# Patient Record
Sex: FEMALE | Employment: FULL TIME | URBAN - METROPOLITAN AREA
[De-identification: names, ages, dates, MRNs, and addresses within clinical notes are randomized per-mention and may not be internally consistent; named-entity substitution may affect disease eponyms.]

---

## 2020-11-29 ENCOUNTER — CLINICAL SUPPORT (OUTPATIENT)
Dept: URGENT CARE | Facility: CLINIC | Age: 25
End: 2020-11-29
Payer: COMMERCIAL

## 2020-11-29 DIAGNOSIS — Z11.59 ENCOUNTER FOR SCREENING FOR OTHER VIRAL DISEASES: Primary | ICD-10-CM

## 2020-11-29 LAB
CTP QC/QA: YES
SARS-COV-2 RDRP RESP QL NAA+PROBE: NEGATIVE

## 2020-11-29 PROCEDURE — U0002: ICD-10-PCS | Mod: QW,S$GLB,, | Performed by: PHYSICIAN ASSISTANT

## 2020-11-29 PROCEDURE — U0002 COVID-19 LAB TEST NON-CDC: HCPCS | Mod: QW,S$GLB,, | Performed by: PHYSICIAN ASSISTANT

## 2025-06-19 ENCOUNTER — OFFICE VISIT (OUTPATIENT)
Dept: URGENT CARE | Facility: CLINIC | Age: 30
End: 2025-06-19
Payer: COMMERCIAL

## 2025-06-19 VITALS
RESPIRATION RATE: 16 BRPM | SYSTOLIC BLOOD PRESSURE: 143 MMHG | WEIGHT: 235 LBS | OXYGEN SATURATION: 98 % | TEMPERATURE: 98 F | HEART RATE: 75 BPM | DIASTOLIC BLOOD PRESSURE: 98 MMHG

## 2025-06-19 DIAGNOSIS — F41.1 GENERALIZED ANXIETY DISORDER WITH PANIC ATTACKS: ICD-10-CM

## 2025-06-19 DIAGNOSIS — F41.0 GENERALIZED ANXIETY DISORDER WITH PANIC ATTACKS: ICD-10-CM

## 2025-06-19 DIAGNOSIS — J02.9 SORE THROAT: ICD-10-CM

## 2025-06-19 DIAGNOSIS — J03.00 ACUTE NON-RECURRENT STREPTOCOCCAL TONSILLITIS: Primary | ICD-10-CM

## 2025-06-19 LAB
CTP QC/QA: YES
MOLECULAR STREP A: POSITIVE

## 2025-06-19 RX ORDER — HYDROXYZINE HYDROCHLORIDE 50 MG/1
50 TABLET, FILM COATED ORAL 4 TIMES DAILY PRN
Qty: 30 TABLET | Refills: 1 | Status: SHIPPED | OUTPATIENT
Start: 2025-06-19

## 2025-06-19 RX ORDER — AMOXICILLIN 500 MG/1
500 TABLET, FILM COATED ORAL EVERY 12 HOURS
Qty: 20 TABLET | Refills: 0 | Status: SHIPPED | OUTPATIENT
Start: 2025-06-19 | End: 2025-06-29

## 2025-06-19 NOTE — LETTER
June 19, 2025      [facility] [place]  [address] [address]  Phone: [phone]  Fax: [phone]       Patient: Eusebia Dumont   YOB: 1995  Date of Visit: 06/19/2025    To Whom It May Concern:    Justice Dumont  was at [facility] on 06/19/2025. The patient may return to work/school on 6/20/2025 with no restrictions. If you have any questions or concerns, or if I can be of further assistance, please do not hesitate to contact me.    Sincerely,      Becky Carrington PA-C

## 2025-06-19 NOTE — PROGRESS NOTES
Subjective:      Patient ID: Eusebia Dumont is a 29 y.o. female.    Vitals:  weight is 106.6 kg (235 lb). Her oral temperature is 97.8 °F (36.6 °C). Her blood pressure is 143/98 (abnormal) and her pulse is 75. Her respiration is 16 and oxygen saturation is 98%.     Chief Complaint: Sore Throat    This is a 29 y.o. female who presents today with a chief complaint of sore throat, hurts to swallow, tonsils are swollen, cough, congestion, trouble swallow x 2 days   Home Tx: Aspirin   PMH: Recurrent strep,     Provider note starts below:  Patient presents to clinic for evaluation of sore throat, pain with swallowing, sore tonsils which onset 2 days ago. 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 She has been taking aspirin and gargling with warm salt water. No fever, body aches, chills. Patient also reports increased stress and anxiety. States she is working on establishing care for this issue, but wondering if she can take anything for her anxiety in the mean time.     Sore Throat   The current episode started in the past 7 days. The problem has been gradually worsening. There has been no fever. The pain is at a severity of 6/10. Associated symptoms include swollen glands and trouble swallowing. Pertinent negatives include no abdominal pain, congestion, coughing, ear pain, headaches, neck pain, shortness of breath or vomiting. She has had no exposure to strep or mono.       Constitution: Negative for chills and fever.   HENT:  Positive for sore throat and trouble swallowing. Negative for ear pain, congestion and voice change.    Neck: Negative for neck pain.   Cardiovascular:  Negative for chest pain and palpitations.   Respiratory:  Negative for cough and shortness of breath.    Gastrointestinal:  Negative for abdominal pain, nausea and vomiting.   Musculoskeletal:  Negative for muscle cramps and muscle ache.   Skin:  Negative for pale and rash.   Neurological:  Negative for dizziness,  light-headedness, headaches, disorientation and altered mental status.   Psychiatric/Behavioral:  Positive for nervous/anxious. Negative for altered mental status, disorientation and confusion. The patient is nervous/anxious.       Objective:     Physical Exam   Constitutional: She is oriented to person, place, and time.  Non-toxic appearance. She does not appear ill. No distress.   HENT:   Head: Normocephalic and atraumatic.   Ears:   Right Ear: Tympanic membrane, external ear and ear canal normal.   Left Ear: Tympanic membrane, external ear and ear canal normal.   Nose: Nose normal.   Mouth/Throat: Mucous membranes are normal. Mucous membranes are moist. Posterior oropharyngeal edema and posterior oropharyngeal erythema present. No oropharyngeal exudate. Tonsils are 3+ on the right. Tonsils are 3+ on the left. No tonsillar exudate.   Eyes: Conjunctivae are normal. Extraocular movement intact   Neck: Neck supple.   Cardiovascular: Normal rate, regular rhythm, normal heart sounds and normal pulses.   Pulmonary/Chest: Effort normal and breath sounds normal. No respiratory distress.   Abdominal: Normal appearance.   Musculoskeletal: Normal range of motion.         General: Normal range of motion.   Lymphadenopathy:     She has cervical adenopathy.   Neurological: She is alert, oriented to person, place, and time and at baseline.   Skin: Skin is warm and dry.   Psychiatric: She experiences Normal attention and Normal perception. Her speech is normal and behavior is normal. Her mood appears anxious. Her affect is tearful.   Nursing note and vitals reviewed.    Assessment:     Results for orders placed or performed in visit on 06/19/25   POCT Strep A, Molecular    Collection Time: 06/19/25  1:36 PM   Result Value Ref Range    Molecular Strep A, POC Positive (A) Negative     Acceptable Yes        1. Acute non-recurrent streptococcal tonsillitis    2. Sore throat    3. Generalized anxiety disorder with panic  attacks        Plan:     Acute non-recurrent streptococcal tonsillitis  -     amoxicillin (AMOXIL) 500 MG Tab; Take 1 tablet (500 mg total) by mouth every 12 (twelve) hours. for 10 days  Dispense: 20 tablet; Refill: 0    Sore throat  -     POCT Strep A, Molecular    Generalized anxiety disorder with panic attacks  -     hydrOXYzine (ATARAX) 50 MG tablet; Take 1 tablet (50 mg total) by mouth 4 (four) times daily as needed for Anxiety.  Dispense: 30 tablet; Refill: 1            Patient Instructions   Anxiety  Hydroxyzine 50 mg tablet up to 4 times daily as needed for anxiety.     Care at home  Set a time to talk with a counselor about your worries and feelings. This can help you with your anxiety.  Take care to follow all instructions when you take your medicines.  Limit alcohol and caffeine.  Learn ways to manage stress. Relaxation methods like reflection, deep breathing, and muscle relaxation may be helpful. Things like yoga, exercise, and michael chi are also good.  Talk about your feelings with family members and friends you trust. Talk to someone who can help you see how your thoughts at certain times may raise your anxiety.    Streptococcal Tonsillitis  Amoxicillin 500 mg twice daily x10 days. This is an antibiotic to treat infection. Please take full course as prescribed. Take with food.    Tylenol (Acetaminophen) 650 mg every 6 to 8 hours and/or Motrin (Ibuprofen) 600 mg to 800 every 6-8 hours as directed for control of pain and/or fever.    Care at home  Discard old toothbrush after 2 days of oral antibiotics.  Do not let others eat/drink after you.  Fever and sore throat typically resolve within one to three days. Most patients can return to work, school, or  after 24 hours of antibiotic therapy, provided they are afebrile and otherwise well.   Please drink plenty of fluids.  Please get plenty of rest.  If you smoke, please stop smoking.  To help ease a sore throat, you can:  Use a sore throat  spray.  Suck on hard candy or throat lozenges.  Gargle with warm saltwater a few times each day. Mix of 1/4 teaspoon (1.25 grams) salt in 8 ounces (240 mL) of warm water.  Use a cool mist humidifier to help you breathe easier.    Should you develop any worsening or new symptoms after leaving urgent care, it is recommended that you go to the ER for further/repeat evaluation.      Follow up with your PCP in 3-5 days after your urgent care visit.     Please remember that you have received care at an urgent care today. Urgent cares are not emergency rooms and are not equipped to handle life threatening emergencies and cannot rule in or out certain medical conditions and you may be released before all of your medical problems are known or treated, please schedule all follow up appointments as discussed and if you have worsening symptoms please go to the ER to rule out potential life threatening problems, as discussed.

## 2025-06-19 NOTE — PATIENT INSTRUCTIONS
Anxiety  Hydroxyzine 50 mg tablet up to 4 times daily as needed for anxiety.     Care at home  Set a time to talk with a counselor about your worries and feelings. This can help you with your anxiety.  Take care to follow all instructions when you take your medicines.  Limit alcohol and caffeine.  Learn ways to manage stress. Relaxation methods like reflection, deep breathing, and muscle relaxation may be helpful. Things like yoga, exercise, and michael chi are also good.  Talk about your feelings with family members and friends you trust. Talk to someone who can help you see how your thoughts at certain times may raise your anxiety.    Streptococcal Tonsillitis  Amoxicillin 500 mg twice daily x10 days. This is an antibiotic to treat infection. Please take full course as prescribed. Take with food.    Tylenol (Acetaminophen) 650 mg every 6 to 8 hours and/or Motrin (Ibuprofen) 600 mg to 800 every 6-8 hours as directed for control of pain and/or fever.    Care at home  Discard old toothbrush after 2 days of oral antibiotics.  Do not let others eat/drink after you.  Fever and sore throat typically resolve within one to three days. Most patients can return to work, school, or  after 24 hours of antibiotic therapy, provided they are afebrile and otherwise well.   Please drink plenty of fluids.  Please get plenty of rest.  If you smoke, please stop smoking.  To help ease a sore throat, you can:  Use a sore throat spray.  Suck on hard candy or throat lozenges.  Gargle with warm saltwater a few times each day. Mix of 1/4 teaspoon (1.25 grams) salt in 8 ounces (240 mL) of warm water.  Use a cool mist humidifier to help you breathe easier.    Should you develop any worsening or new symptoms after leaving urgent care, it is recommended that you go to the ER for further/repeat evaluation.      Follow up with your PCP in 3-5 days after your urgent care visit.     Please remember that you have received care at an urgent care  today. Urgent cares are not emergency rooms and are not equipped to handle life threatening emergencies and cannot rule in or out certain medical conditions and you may be released before all of your medical problems are known or treated, please schedule all follow up appointments as discussed and if you have worsening symptoms please go to the ER to rule out potential life threatening problems, as discussed.